# Patient Record
Sex: FEMALE | Race: WHITE | NOT HISPANIC OR LATINO | URBAN - METROPOLITAN AREA
[De-identification: names, ages, dates, MRNs, and addresses within clinical notes are randomized per-mention and may not be internally consistent; named-entity substitution may affect disease eponyms.]

---

## 2019-07-13 ENCOUNTER — INPATIENT HOSPITAL (OUTPATIENT)
Dept: URBAN - METROPOLITAN AREA HOSPITAL 133 | Facility: HOSPITAL | Age: 39
End: 2019-07-13
Payer: COMMERCIAL

## 2019-07-13 DIAGNOSIS — R74.8 ABNORMAL LEVELS OF OTHER SERUM ENZYMES: ICD-10-CM

## 2019-07-13 DIAGNOSIS — B19.20 UNSPECIFIED VIRAL HEPATITIS C WITHOUT HEPATIC COMA: ICD-10-CM

## 2019-07-13 DIAGNOSIS — K80.50 CALCULUS OF BILE DUCT WITHOUT CHOLANGITIS OR CHOLECYSTITIS W: ICD-10-CM

## 2019-07-13 PROCEDURE — 99223 1ST HOSP IP/OBS HIGH 75: CPT

## 2019-07-14 ENCOUNTER — INPATIENT HOSPITAL (OUTPATIENT)
Dept: URBAN - METROPOLITAN AREA HOSPITAL 133 | Facility: HOSPITAL | Age: 39
End: 2019-07-14
Payer: COMMERCIAL

## 2019-07-14 DIAGNOSIS — K80.50 CALCULUS OF BILE DUCT WITHOUT CHOLANGITIS OR CHOLECYSTITIS W: ICD-10-CM

## 2019-07-14 DIAGNOSIS — R10.13 EPIGASTRIC PAIN: ICD-10-CM

## 2019-07-14 PROCEDURE — 99232 SBSQ HOSP IP/OBS MODERATE 35: CPT

## 2025-05-12 ENCOUNTER — HOSPITAL ENCOUNTER (EMERGENCY)
Facility: HOSPITAL | Age: 45
Discharge: LEFT BEFORE DISCHARGE | End: 2025-05-12
Attending: EMERGENCY MEDICINE | Admitting: EMERGENCY MEDICINE
Payer: MEDICARE

## 2025-05-12 ENCOUNTER — APPOINTMENT (OUTPATIENT)
Dept: CT IMAGING | Facility: HOSPITAL | Age: 45
End: 2025-05-12
Payer: MEDICARE

## 2025-05-12 VITALS
SYSTOLIC BLOOD PRESSURE: 122 MMHG | WEIGHT: 192.02 LBS | DIASTOLIC BLOOD PRESSURE: 88 MMHG | RESPIRATION RATE: 14 BRPM | BODY MASS INDEX: 32.78 KG/M2 | OXYGEN SATURATION: 97 % | HEART RATE: 73 BPM | HEIGHT: 64 IN | TEMPERATURE: 97.9 F

## 2025-05-12 PROCEDURE — 72131 CT LUMBAR SPINE W/O DYE: CPT

## 2025-05-12 PROCEDURE — 99284 EMERGENCY DEPT VISIT MOD MDM: CPT

## 2025-05-12 RX ORDER — GABAPENTIN 600 MG/1
600 TABLET ORAL 3 TIMES DAILY
COMMUNITY
Start: 2025-05-07

## 2025-05-12 RX ORDER — IBUPROFEN 600 MG/1
600 TABLET, FILM COATED ORAL EVERY 6 HOURS PRN
COMMUNITY

## 2025-05-12 RX ORDER — PRAZOSIN HYDROCHLORIDE 2 MG/1
1 CAPSULE ORAL
COMMUNITY

## 2025-05-12 RX ORDER — CITALOPRAM HYDROBROMIDE 40 MG/1
40 TABLET ORAL DAILY
COMMUNITY

## 2025-05-12 RX ORDER — DIAZEPAM 5 MG/1
1 TABLET ORAL 2 TIMES DAILY PRN
COMMUNITY

## 2025-05-12 RX ORDER — DEXTROAMPHETAMINE SACCHARATE, AMPHETAMINE ASPARTATE, DEXTROAMPHETAMINE SULFATE AND AMPHETAMINE SULFATE 7.5; 7.5; 7.5; 7.5 MG/1; MG/1; MG/1; MG/1
30 TABLET ORAL 2 TIMES DAILY
COMMUNITY

## 2025-05-12 NOTE — ED PROVIDER NOTES
Time: 2:30 PM EDT  Date of encounter:  5/12/2025  Independent Historian/Clinical History and Information was obtained by:   Patient    History is limited by: N/A    Chief Complaint   Patient presents with    Back Pain         History of Present Illness:  Patient is a 44 y.o. year old female who presents to the emergency department for evaluation of back pain.  Patient states that she has had spinal surgery with hardware placed at [facility].  Patient states she called her surgeon today and explained to them that she had loss of bowel and bladder function and she was told to report to the emergency department.  She was transported to the emergency department via EMS.    Patient Care Team  Primary Care Provider: Provider, No Known    Past Medical History:     No Known Allergies  Past Medical History:   Diagnosis Date    Injury of back      History reviewed. No pertinent surgical history.  History reviewed. No pertinent family history.    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   HENT:  Negative for congestion, rhinorrhea and sore throat.    Eyes:  Negative for pain and visual disturbance.   Respiratory:  Negative for apnea, cough, chest tightness and shortness of breath.    Cardiovascular:  Negative for chest pain and palpitations.   Gastrointestinal:  Negative for abdominal pain, diarrhea, nausea and vomiting.   Genitourinary:  Negative for difficulty urinating and dysuria.   Musculoskeletal:  Positive for back pain. Negative for joint swelling and myalgias.   Skin:  Negative for color change.   Neurological:  Positive  "for light-headedness and headaches. Negative for seizures.   Psychiatric/Behavioral: Negative.  Negative for agitation and behavioral problems.    All other systems reviewed and are negative.       Physical Exam:  /88 (Patient Position: Sitting)   Pulse 73   Temp 97.9 °F (36.6 °C) (Oral)   Resp 14   Ht 162.6 cm (64\")   Wt 87.1 kg (192 lb 0.3 oz)   LMP 04/17/2025 (Exact Date)   SpO2 97%   BMI 32.96 kg/m²         Physical Exam  Vitals and nursing note reviewed.   Constitutional:       General: She is not in acute distress.     Appearance: Normal appearance. She is not toxic-appearing.   HENT:      Head: Normocephalic and atraumatic.      Jaw: There is normal jaw occlusion.   Eyes:      General: Lids are normal.      Extraocular Movements: Extraocular movements intact.      Conjunctiva/sclera: Conjunctivae normal.      Pupils: Pupils are equal, round, and reactive to light.   Cardiovascular:      Rate and Rhythm: Normal rate and regular rhythm.      Pulses: Normal pulses.      Heart sounds: Normal heart sounds.   Pulmonary:      Effort: Pulmonary effort is normal. No respiratory distress.      Breath sounds: Normal breath sounds. No wheezing or rhonchi.   Abdominal:      General: Abdomen is flat.      Palpations: Abdomen is soft.      Tenderness: There is no abdominal tenderness. There is no guarding or rebound.   Musculoskeletal:         General: Normal range of motion.      Cervical back: Normal range of motion and neck supple.      Right lower leg: No edema.      Left lower leg: No edema.   Skin:     General: Skin is warm and dry.   Neurological:      Mental Status: She is alert and oriented to person, place, and time. Mental status is at baseline.   Psychiatric:         Mood and Affect: Mood normal.                            Medical Decision Making:      Comorbidities that affect care:    None    External Notes reviewed:    Previous Radiological Studies: Spine x-ray dated 7/18/2024 and this was also " the film that today's x-ray had been compared to.      The following orders were placed and all results were independently analyzed by me:  Orders Placed This Encounter   Procedures    CT Lumbar Spine Without Contrast       Medications Given in the Emergency Department:  Medications - No data to display     ED Course:    The patient was initially evaluated in the triage area where orders were placed. The patient was later dispositioned by OLMAN Rees.      The patient was advised to stay for completion of workup which includes but is not limited to communication of labs and radiological results, reassessment and plan. The patient was advised that leaving prior to disposition by a provider could result in critical findings that are not communicated to the patient.          Labs:    Lab Results (last 24 hours)       ** No results found for the last 24 hours. **             Imaging:    CT Lumbar Spine Without Contrast  Result Date: 5/12/2025  CT LUMBAR SPINE WO CONTRAST Date of Exam: 5/12/2025 3:16 PM EDT Indication: Low back pain, previous spinal fusion, report of loss of bowel and bladder function.. Comparison: None available. Technique: Axial CT images were obtained of the lumbar spine without contrast administration.  Reconstructed coronal and sagittal images were also obtained. Automated exposure control and iterative construction methods were used. Findings: There are bilateral transpedicular screws at L4, L5, and S1. The patient's had interbody fusion surgery within the L4-L5 and L5-S1 lumbar discs. There are posterior fixation rods in place. The right transpedicular screw at S1 appears fractured at its connection with the chrissy. The screw extends through the right aspect of the L5-S1 disc eroding a portion of the anterior aspect of the inferior endplate of L5. Otherwise, there does not appear to be evidence of hardware failure. The patient appears to have had foraminotomies at L5-S1 bilaterally with  resection of a portion of the facet joints. The patient's had a left-sided hemilaminectomy at L4-L5 with suggestion of a left-sided foraminotomy. There is mild anterior endplate spurring at L3-L4 consistent with spondylosis. The facet joints are intact within the remaining thoracolumbar spine. It is difficult to exclude canal stenosis and neural foraminal narrowing at L5-S1 due to the postsurgical changes in the soft tissues and scatter artifact from the hardware. There is no right L4-L5 neural foraminal narrowing. It is difficult to exclude canal stenosis and left L4-L5 neural foraminal narrowing due postsurgical changes within the soft tissues. There is no herniated disc, canal, or neural foraminal stenosis suggested in the remaining thoracolumbar spine. The visualized paraspinal soft tissues outside of postsurgical changes appear within range of normal.     Impression: 1.Postsurgical changes as described in detail above. 2.The right transpedicular screw at S1 appears fractured at its connection with the chrissy. The screw extends through the right aspect of the L5-S1 disc eroding a portion of the anterior aspect of the inferior endplate of L5. Otherwise, there is no evidence  of hardware failure. 3.There is no right L4-L5 neural foraminal narrowing. It is difficult to exclude canal stenosis and left L4-L5 neural foraminal narrowing due to postsurgical changes within the soft tissues. It is also difficult to exclude canal stenosis and neural foraminal narrowing bilaterally at L5-S1 due to postsurgical changes within the soft tissues and scatter artifact from the hardware. 4.No herniated disc, canal stenosis, or neural foraminal stenosis is suggested in the remaining thoracolumbar spine. Electronically Signed: Juma Burroughs MD  5/12/2025 4:03 PM EDT  Workstation ID: [id number]        Differential Diagnosis and Discussion:      Back Pain: The patient presents with back pain. My differential diagnosis includes but is not  limited to acute spinal epidural abscess, acute spinal epidural bleed, cauda equina syndrome, abdominal aortic aneurysm, aortic dissection, kidney stone, pyelonephritis, musculoskeletal back pain, spinal fracture, and osteoarthritis.     PROCEDURES:    CT scan was performed in the emergency department and the CT scan radiology impression was interpreted by me.    No orders to display        Procedures    MDM     Amount and/or Complexity of Data Reviewed  Tests in the radiology section of CPT®: reviewed                     Patient Care Considerations:    MRI: I considered ordering an MRI however once it was ordered, the patient decided to leave AMA.      Consultants/Shared Management Plan:    None    Social Determinants of Health:    Patient is independent, reliable, and has access to care.       Disposition and Care Coordination:    Discharged: The patient is suitable and stable for discharge with no need for consideration of admission.    I have explained the patient´s condition, diagnoses and treatment plan based on the information available to me at this time. I have answered questions and addressed any concerns. The patient has a good  understanding of the patient´s diagnosis, condition, and treatment plan as can be expected at this point. The vital signs have been stable. The patient´s condition is stable and appropriate for discharge from the emergency department.      The patient will pursue further outpatient evaluation with the primary care physician or other designated or consulting physician as outlined in the discharge instructions. They are agreeable to this plan of care and follow-up instructions have been explained in detail. The patient has received these instructions in written format and has expressed an understanding of the discharge instructions. The patient is aware that any significant change in condition or worsening of symptoms should prompt an immediate return to this or the closest  emergency department or call to 911.  I have explained discharge medications and the need for follow up with the patient/caretakers. This was also printed in the discharge instructions. Patient was discharged with the following medications and follow up:      Medication List      No changes were made to your prescriptions during this visit.      No follow-up provider specified.     Final diagnoses:   None        ED Disposition       ED Disposition   AMA    Condition   --    Comment   Advised patient on order for MRI due to bowel incontinence as a new symptom.  Patient states that she is having no change in pain that she normally has, and since there is no change in the CT scan of her lower back, she just wants to go home.  She st ates that if anything changes she will return to the emergency department.  She is instructed on the need to stay for the MRI due to loss of bowel recently.  Patient again states that she will be fine and will return to the ER if anything changes.               This medical record created using voice recognition software.             Gypsy Nguyen, APRN  05/12/25 [id number]

## 2025-05-12 NOTE — Clinical Note
Advised patient on order for MRI due to bowel incontinence as a new symptom.  Patient states that she is having no change in pain that she normally has,